# Patient Record
Sex: MALE | Race: WHITE | NOT HISPANIC OR LATINO | Employment: OTHER | URBAN - METROPOLITAN AREA
[De-identification: names, ages, dates, MRNs, and addresses within clinical notes are randomized per-mention and may not be internally consistent; named-entity substitution may affect disease eponyms.]

---

## 2018-07-25 ENCOUNTER — HOSPITAL ENCOUNTER (INPATIENT)
Facility: MEDICAL CENTER | Age: 73
LOS: 1 days | DRG: 176 | End: 2018-07-26
Attending: EMERGENCY MEDICINE | Admitting: INTERNAL MEDICINE
Payer: COMMERCIAL

## 2018-07-25 DIAGNOSIS — I26.99 OTHER ACUTE PULMONARY EMBOLISM WITHOUT ACUTE COR PULMONALE (HCC): ICD-10-CM

## 2018-07-25 PROBLEM — R10.9 ABDOMINAL PAIN: Status: ACTIVE | Noted: 2018-07-25

## 2018-07-25 PROBLEM — R60.0 EDEMA OF BOTH LEGS: Status: ACTIVE | Noted: 2018-07-25

## 2018-07-25 PROBLEM — D64.9 ANEMIA: Status: ACTIVE | Noted: 2018-07-25

## 2018-07-25 LAB
ALBUMIN SERPL BCP-MCNC: 3.7 G/DL (ref 3.2–4.9)
ALBUMIN/GLOB SERPL: 1.5 G/DL
ALP SERPL-CCNC: 72 U/L (ref 30–99)
ALT SERPL-CCNC: 31 U/L (ref 2–50)
ANION GAP SERPL CALC-SCNC: 7 MMOL/L (ref 0–11.9)
APTT PPP: 99.7 SEC (ref 24.7–36)
AST SERPL-CCNC: 17 U/L (ref 12–45)
BILIRUB SERPL-MCNC: 0.5 MG/DL (ref 0.1–1.5)
BNP SERPL-MCNC: 28 PG/ML (ref 0–100)
BUN SERPL-MCNC: 13 MG/DL (ref 8–22)
CALCIUM SERPL-MCNC: 8.9 MG/DL (ref 8.5–10.5)
CHLORIDE SERPL-SCNC: 101 MMOL/L (ref 96–112)
CO2 SERPL-SCNC: 28 MMOL/L (ref 20–33)
CREAT SERPL-MCNC: 0.91 MG/DL (ref 0.5–1.4)
EKG IMPRESSION: NORMAL
ERYTHROCYTE [DISTWIDTH] IN BLOOD BY AUTOMATED COUNT: 44.3 FL (ref 35.9–50)
FOLATE SERPL-MCNC: 14.3 NG/ML
GLOBULIN SER CALC-MCNC: 2.4 G/DL (ref 1.9–3.5)
GLUCOSE SERPL-MCNC: 94 MG/DL (ref 65–99)
HCT VFR BLD AUTO: 35.3 % (ref 42–52)
HGB BLD-MCNC: 11.5 G/DL (ref 14–18)
MAGNESIUM SERPL-MCNC: 1.8 MG/DL (ref 1.5–2.5)
MCH RBC QN AUTO: 29.4 PG (ref 27–33)
MCHC RBC AUTO-ENTMCNC: 32.6 G/DL (ref 33.7–35.3)
MCV RBC AUTO: 90.3 FL (ref 81.4–97.8)
PHOSPHATE SERPL-MCNC: 4 MG/DL (ref 2.5–4.5)
PLATELET # BLD AUTO: 309 K/UL (ref 164–446)
PMV BLD AUTO: 9.2 FL (ref 9–12.9)
POTASSIUM SERPL-SCNC: 3.9 MMOL/L (ref 3.6–5.5)
PROT SERPL-MCNC: 6.1 G/DL (ref 6–8.2)
RBC # BLD AUTO: 3.91 M/UL (ref 4.7–6.1)
SODIUM SERPL-SCNC: 136 MMOL/L (ref 135–145)
TROPONIN I SERPL-MCNC: <0.01 NG/ML (ref 0–0.04)
WBC # BLD AUTO: 5.6 K/UL (ref 4.8–10.8)

## 2018-07-25 PROCEDURE — 99223 1ST HOSP IP/OBS HIGH 75: CPT | Mod: 25 | Performed by: INTERNAL MEDICINE

## 2018-07-25 PROCEDURE — 96365 THER/PROPH/DIAG IV INF INIT: CPT

## 2018-07-25 PROCEDURE — 84100 ASSAY OF PHOSPHORUS: CPT

## 2018-07-25 PROCEDURE — 99285 EMERGENCY DEPT VISIT HI MDM: CPT

## 2018-07-25 PROCEDURE — 83735 ASSAY OF MAGNESIUM: CPT

## 2018-07-25 PROCEDURE — 700111 HCHG RX REV CODE 636 W/ 250 OVERRIDE (IP): Performed by: STUDENT IN AN ORGANIZED HEALTH CARE EDUCATION/TRAINING PROGRAM

## 2018-07-25 PROCEDURE — 93306 TTE W/DOPPLER COMPLETE: CPT

## 2018-07-25 PROCEDURE — 93970 EXTREMITY STUDY: CPT | Mod: 26 | Performed by: SURGERY

## 2018-07-25 PROCEDURE — 93306 TTE W/DOPPLER COMPLETE: CPT | Mod: 26 | Performed by: INTERNAL MEDICINE

## 2018-07-25 PROCEDURE — 36415 COLL VENOUS BLD VENIPUNCTURE: CPT

## 2018-07-25 PROCEDURE — 93005 ELECTROCARDIOGRAM TRACING: CPT | Performed by: INTERNAL MEDICINE

## 2018-07-25 PROCEDURE — 80053 COMPREHEN METABOLIC PANEL: CPT

## 2018-07-25 PROCEDURE — 93970 EXTREMITY STUDY: CPT

## 2018-07-25 PROCEDURE — 83880 ASSAY OF NATRIURETIC PEPTIDE: CPT

## 2018-07-25 PROCEDURE — 96375 TX/PRO/DX INJ NEW DRUG ADDON: CPT

## 2018-07-25 PROCEDURE — 85027 COMPLETE CBC AUTOMATED: CPT

## 2018-07-25 PROCEDURE — 85730 THROMBOPLASTIN TIME PARTIAL: CPT

## 2018-07-25 PROCEDURE — 84484 ASSAY OF TROPONIN QUANT: CPT

## 2018-07-25 PROCEDURE — 770020 HCHG ROOM/CARE - TELE (206)

## 2018-07-25 PROCEDURE — 82746 ASSAY OF FOLIC ACID SERUM: CPT

## 2018-07-25 PROCEDURE — 93010 ELECTROCARDIOGRAM REPORT: CPT | Performed by: INTERNAL MEDICINE

## 2018-07-25 RX ORDER — BISACODYL 10 MG
10 SUPPOSITORY, RECTAL RECTAL
Status: DISCONTINUED | OUTPATIENT
Start: 2018-07-25 | End: 2018-07-26 | Stop reason: HOSPADM

## 2018-07-25 RX ORDER — ACETAMINOPHEN 325 MG/1
650 TABLET ORAL EVERY 6 HOURS PRN
Status: DISCONTINUED | OUTPATIENT
Start: 2018-07-25 | End: 2018-07-26 | Stop reason: HOSPADM

## 2018-07-25 RX ORDER — LABETALOL HYDROCHLORIDE 5 MG/ML
10 INJECTION, SOLUTION INTRAVENOUS EVERY 4 HOURS PRN
Status: DISCONTINUED | OUTPATIENT
Start: 2018-07-25 | End: 2018-07-26 | Stop reason: HOSPADM

## 2018-07-25 RX ORDER — HEPARIN SODIUM 1000 [USP'U]/ML
3800 INJECTION, SOLUTION INTRAVENOUS; SUBCUTANEOUS PRN
Status: DISCONTINUED | OUTPATIENT
Start: 2018-07-25 | End: 2018-07-25

## 2018-07-25 RX ORDER — HEPARIN SODIUM 1000 [USP'U]/ML
7000 INJECTION, SOLUTION INTRAVENOUS; SUBCUTANEOUS ONCE
Status: COMPLETED | OUTPATIENT
Start: 2018-07-25 | End: 2018-07-25

## 2018-07-25 RX ORDER — AMOXICILLIN 250 MG
2 CAPSULE ORAL 2 TIMES DAILY
Status: DISCONTINUED | OUTPATIENT
Start: 2018-07-25 | End: 2018-07-26 | Stop reason: HOSPADM

## 2018-07-25 RX ORDER — POLYETHYLENE GLYCOL 3350 17 G/17G
1 POWDER, FOR SOLUTION ORAL
Status: DISCONTINUED | OUTPATIENT
Start: 2018-07-25 | End: 2018-07-26 | Stop reason: HOSPADM

## 2018-07-25 RX ADMIN — HEPARIN SODIUM 7000 UNITS: 1000 INJECTION, SOLUTION INTRAVENOUS; SUBCUTANEOUS at 04:23

## 2018-07-25 RX ADMIN — HEPARIN SODIUM 25000 UNITS: 5000 INJECTION, SOLUTION INTRAVENOUS at 04:23

## 2018-07-25 RX ADMIN — ENOXAPARIN SODIUM 120 MG: 150 INJECTION SUBCUTANEOUS at 16:52

## 2018-07-25 ASSESSMENT — ENCOUNTER SYMPTOMS
PND: 0
LOSS OF CONSCIOUSNESS: 0
FLANK PAIN: 0
SORE THROAT: 0
WEIGHT LOSS: 0
WHEEZING: 0
FEVER: 0
DOUBLE VISION: 0
ABDOMINAL PAIN: 0
DEPRESSION: 0
MYALGIAS: 0
WEAKNESS: 0
CONSTIPATION: 0
INSOMNIA: 0
PHOTOPHOBIA: 0
DIARRHEA: 0
BLOOD IN STOOL: 0
ORTHOPNEA: 0
BACK PAIN: 0
HEMOPTYSIS: 0
VOMITING: 0
HEADACHES: 0
CHILLS: 0
PALPITATIONS: 0
CLAUDICATION: 0
TINGLING: 0
EYE PAIN: 0
SHORTNESS OF BREATH: 0
SENSORY CHANGE: 0
BLURRED VISION: 0
DIAPHORESIS: 0
SEIZURES: 0
NAUSEA: 0
EYE DISCHARGE: 0
BRUISES/BLEEDS EASILY: 0
ABDOMINAL PAIN: 1
HEARTBURN: 0
SPUTUM PRODUCTION: 0
DIZZINESS: 0
NECK PAIN: 0
COUGH: 0

## 2018-07-25 ASSESSMENT — LIFESTYLE VARIABLES
TOTAL SCORE: 0
TOTAL SCORE: 0
EVER_SMOKED: YES
HAVE YOU EVER FELT YOU SHOULD CUT DOWN ON YOUR DRINKING: NO
TOTAL SCORE: 0
SUBSTANCE_ABUSE: 0
HAVE PEOPLE ANNOYED YOU BY CRITICIZING YOUR DRINKING: NO
ON A TYPICAL DAY WHEN YOU DRINK ALCOHOL HOW MANY DRINKS DO YOU HAVE: 1
AVERAGE NUMBER OF DAYS PER WEEK YOU HAVE A DRINK CONTAINING ALCOHOL: 4
CONSUMPTION TOTAL: NEGATIVE
EVER FELT BAD OR GUILTY ABOUT YOUR DRINKING: NO
EVER HAD A DRINK FIRST THING IN THE MORNING TO STEADY YOUR NERVES TO GET RID OF A HANGOVER: NO
ALCOHOL_USE: YES
HOW MANY TIMES IN THE PAST YEAR HAVE YOU HAD 5 OR MORE DRINKS IN A DAY: 0
EVER_SMOKED: YES

## 2018-07-25 ASSESSMENT — COGNITIVE AND FUNCTIONAL STATUS - GENERAL
SUGGESTED CMS G CODE MODIFIER MOBILITY: CH
MOBILITY SCORE: 24
SUGGESTED CMS G CODE MODIFIER DAILY ACTIVITY: CH
DAILY ACTIVITIY SCORE: 24

## 2018-07-25 ASSESSMENT — PATIENT HEALTH QUESTIONNAIRE - PHQ9
2. FEELING DOWN, DEPRESSED, IRRITABLE, OR HOPELESS: NOT AT ALL
SUM OF ALL RESPONSES TO PHQ9 QUESTIONS 1 AND 2: 0
1. LITTLE INTEREST OR PLEASURE IN DOING THINGS: NOT AT ALL

## 2018-07-25 ASSESSMENT — PAIN SCALES - GENERAL
PAINLEVEL_OUTOF10: 0

## 2018-07-25 ASSESSMENT — COPD QUESTIONNAIRES
DURING THE PAST 4 WEEKS HOW MUCH DID YOU FEEL SHORT OF BREATH: NONE/LITTLE OF THE TIME
HAVE YOU SMOKED AT LEAST 100 CIGARETTES IN YOUR ENTIRE LIFE: YES
COPD SCREENING SCORE: 4
DO YOU EVER COUGH UP ANY MUCUS OR PHLEGM?: NO/ONLY WITH OCCASIONAL COLDS OR INFECTIONS

## 2018-07-25 NOTE — ASSESSMENT & PLAN NOTE
Pt presented to ED for sharp/stabbing upper abdomial pain that's worsened by deep breaths. Radiates to R.Lung  [facility]  [facility] showed L. Lower lobe atelectasis vs pneumonia scar  [facility] showed Segmental embolus in the right lower lobe along with me  U/S of both lower legs, proximal and distal to R/O DVT  Started Pt on heparin  Rectal exam was negative for blood  Ordered FOBT

## 2018-07-25 NOTE — H&P
Internal Medicine Admitting History and Physical    Note Author: Jeremi James M.D.       Name Pramod Guillen 1945   Age/Sex 73 y.o. male   MRN [id number]   Code Status Full code     After 5PM or if no immediate response to page, please call for cross-coverage  Attending/Team: Angel See Patient List for primary contact information  Call [phone] to page    1st Call - Day Intern (R1):   Genaro 2nd Call - Day Sr. Resident (R2/R3):   Yaneli       Chief Complaint:   Upper abdominal pain    HPI:  Pt is 74 YO male came through ED for sharp/stabbing upper abdomial pain that's worsened by deep breaths. He reports the pain started on 2018 then went away and came back much worse on 2018. It has not gone away since then. 8/10 on pain scale. [facility] showed Segmental embolus in the R. lower lobe.Pt also reports his legs have swollen rently. Denies chest pain, SOB, cough or Palpitations. Rectal exam was negative for blood.    Review of Systems   Constitutional: Negative for chills, diaphoresis, fever, malaise/fatigue and weight loss.   HENT: Negative for ear discharge, ear pain, hearing loss, nosebleeds and tinnitus.    Eyes: Negative for blurred vision, double vision, photophobia, pain and discharge.   Respiratory: Negative for cough, hemoptysis, sputum production, shortness of breath and wheezing.    Cardiovascular: Positive for leg swelling. Negative for chest pain, palpitations, orthopnea, claudication and PND.   Gastrointestinal: Positive for abdominal pain. Negative for blood in stool, constipation, diarrhea, heartburn, melena, nausea and vomiting.   Genitourinary: Negative for dysuria, flank pain, frequency, hematuria and urgency.   Musculoskeletal: Negative for back pain, joint pain, myalgias and neck pain.   Skin: Negative for itching and rash.   Neurological: Negative for dizziness, tingling, seizures, loss of consciousness,  "weakness and headaches.   Endo/Heme/Allergies: Negative for environmental allergies. Does not bruise/bleed easily.   Psychiatric/Behavioral: Negative for depression, substance abuse and suicidal ideas.             Past Medical History (Chronic medical problem, known complications and current treatment)     Hernia when the Pt was 8 years old    Past Surgical History:  History reviewed. No pertinent surgical history.    Current Outpatient Medications:  Home Medications    **Home medications have not yet been reviewed for this encounter**         Medication Allergy/Sensitivities:  Allergies not on file      Family History (mandatory)   History reviewed. No pertinent family history.    Social History (mandatory)   Social History     Social History   • Marital status:      Spouse name: N/A   • Number of children: N/A   • Years of education: N/A     Occupational History   • Not on file.     Social History Main Topics   • Smoking status: Never Smoker   • Smokeless tobacco: Never Used   • Alcohol use Yes      Comment: occasional beer    • Drug use: No   • Sexual activity: Not on file     Other Topics Concern   • Not on file     Social History Narrative   • No narrative on file     Living situation: Alone  PCP : No primary care provider on file.    Physical Exam     Vitals:    07/25/18 0009 07/25/18 0011 07/25/18 0427   BP:  131/70    Pulse:  70 65   Resp:  16 20   SpO2:  96% 98%   Weight: 116.6 kg (257 lb)     Height: 1.88 m (6' 2\")       Body mass index is 33 kg/m².  /70   Pulse 65   Resp 20   Ht 1.88 m (6' 2\")   Wt 116.6 kg (257 lb)   SpO2 98%   BMI 33.00 kg/m²   O2 therapy: Pulse Oximetry: 98 %, O2 (LPM): 2    Physical Exam   Constitutional: He is oriented to person, place, and time and well-developed, well-nourished, and in no distress. No distress.   HENT:   Head: Normocephalic and atraumatic.   Mouth/Throat: Oropharynx is clear and moist. No oropharyngeal exudate.   Eyes: Pupils are equal, round, " and reactive to light. Conjunctivae and EOM are normal. Right eye exhibits no discharge. Left eye exhibits no discharge. No scleral icterus.   Neck: Normal range of motion. Neck supple. No JVD present. No tracheal deviation present. No thyromegaly present.   Cardiovascular: Normal rate, regular rhythm, normal heart sounds and intact distal pulses.  Exam reveals no gallop and no friction rub.    No murmur heard.  Pulmonary/Chest: Effort normal and breath sounds normal. No stridor. No respiratory distress. He has no wheezes. He has no rales. He exhibits no tenderness.   Abdominal: Soft. Bowel sounds are normal. He exhibits no mass. There is tenderness (RUQ, Epigastric and LUQ). There is no rebound and no guarding.   Genitourinary: Rectum normal.   Genitourinary Comments: FOBT negative   Musculoskeletal: Normal range of motion. He exhibits edema (Pitting edema in both legs but L>R). He exhibits no tenderness or deformity.   Warm L. leg   Neurological: He is alert and oriented to person, place, and time. He displays normal reflexes. A cranial nerve deficit is present. He exhibits normal muscle tone. Coordination normal. GCS score is 15.   Skin: Skin is warm and dry. No rash noted. He is not diaphoretic. No erythema. No pallor.   Psychiatric: Mood, memory, affect and judgment normal.         Data Review       Old Records Request:   Completed  Current Records review/summary: Completed    Lab Data Review:  No results found for this or any previous visit (from the past 24 hour(s)).    Imaging/Procedures Review:    Independant Imaging Review: Completed  LE VENOUS DUPLEX    (Results Pending)            EKG:   EKG Independant Review: Completed  QTc:, HR: , Normal Sinus Rhythm, no ST/T changes     Records reviewed and summarized in current documentation :  Yes  [employer] teaching service handout given to patient:  Yes         Assessment/Plan     * Abdominal pain   Assessment & Plan    Pt presented to ED for sharp/stabbing upper  abdomial pain that's worsened by deep breaths. Radiates to R.Lung    - Ordered CBC, CMP   - [facility]  - [facility] showed L. Lower lobe atelectasis vs pneumonia scar  - CT [facility] showed Segmental embolus in the right lower lobe along with me  - U/S of both lower legs, proximal and distal to R/O DVT  - Started Pt on heparin  - Rectal exam was negative for blood  - Ordered FOBT        Edema of both legs   Assessment & Plan    Pt presented to ED for sharp/stabbing upper abdomial pain that's worsened by deep breaths    -U/S of both lower legs, proximal and distal to R/O DVT  - During Physical exam pt did have pitting edema in both legs(L>R) and L. Leg was worm  - CT [facility] showed Segmental embolus in the right lower lobe along with me  - Started Pt on heparin  - Rectal exam was negative for blood  - Ordered FOBT        Pulmonary embolism (HCC)   Assessment & Plan    Pt presented to ED for sharp/stabbing upper abdomial pain that's worsened by deep breaths  - [facility] showed Segmental embolus in the right lower lobe along with me  - Started Pt on heparin  - Rectal exam was negative for blood  - Ordered FOBT  -U/S of both lower legs, proximal and distal to R/O DVT  - During Physical exam pt did have pitting edema in both legs(L>R) and L. Leg was worm  - KUB from the [facility] showed L. Lower lobe atelectasis vs pneumonia scar            Anticipated Hospital stay:  >2 midnights        Quality Measures  Quality-Core Measures   Reviewed items::  EKG reviewed, Labs reviewed, Medications reviewed and Radiology images reviewed  Smith catheter::  No Smith  DVT prophylaxis pharmacological::  Heparin    PCP: No primary care provider on file.

## 2018-07-25 NOTE — SENIOR ADMIT NOTE
"73 y.o. male [facility] where [facility] presented with a 3 day history of right sided pleuritic chest pain and was noted for right subsegmental pulmonary embolus. He associates it with bilateral lower extremity edema and admits to increased sedentary lifestyle within the last year as his wife passed away back then.  He denies dyspnea at rest or exertion, hemoptysis, treatment for active malignancy, palpitations, surgery within the last 4 weeks or history of PE/DVT.      Exam:  /69   Pulse 73   Temp 36.5 °C (97.7 °F)   Resp 19   Ht 1.88 m (6' 2\")   Wt 112.4 kg (247 lb 12.8 oz)   SpO2 95%   BMI 31.82 kg/m²     Physical exam:   General: comfortable,not in acute distress  HEENT: NC/AT. PERRLA, EOMI. moist mucous membranes. No lymphadenopathy.  CVS: RRR, S1S2 WNL, no MRG  RS: CTA, good air entry. No wheezes or ronchi.  Abdomen: Soft, NT/ND, BS +. No organomegaly  Ext: No pedal edema  Rectal: Guaiac neg  Neuro: CN 2-12 wnl. Motor and sensory exam wnl.    Assessment and Plan:  #Right subsegmental pulmonary embolus  -3 day h/o of pleuritic chest pain  -Admits to h/o of sedentary lifestyle  -Lt LE edema  -No dyspnea, hemoptysis, recent surgery or malignancy  -Noted for PE on [facility] CT  -Guaiac neg  -Started on Heparin drip  -Pending LE DVT U/S   -Labs on AM      For further details , please refer to H&P by Dr. James    "

## 2018-07-25 NOTE — DISCHARGE PLANNING
Anticipated Discharge Disposition: Home    Action: PT lives in [place] and was independent before admissions.  Pt has PCP Anjel at the [facility] in [place].  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  Pt wife passed 2 yrs ago and states he hasn't been cooking healthy meals since then.  Pt on 2 L O2 now but does not use O2 at home.  Also pt was not taking anticoagulants at home.    Barriers to Discharge: medical clearance    Plan: F/u with home O2 and prior auth for medications if needed

## 2018-07-25 NOTE — PROGRESS NOTES
Internal Medicine Interval Note  Note Author: Rosalino Lam D.O.     Name Pramod Guillen 1945   Age/Sex 73 y.o. male   MRN [id number]   Code Status FULL     After 5PM or if no immediate response to page, please call for cross-coverage  Attending/Team: Angel See Patient List for primary contact information  Call [phone] to page    1st Call - Day Intern (R1):   Dr. Lam 2nd Call - Day Sr. Resident (R2/R3):   Dr. Groves         Reason for interval visit  (Principal Problem)   Right Lower Extremity Weakness      Interval Problem Daily Status Update  (24 hours, problem oriented, brief subjective history, new lab/imaging data pertinent to that problem)     Patient reports that he has some pain in his right flank when he breathes deep[facility] in the right lower lobe.    Rectal exam shows negative guiac. FOBT pending.    Patient started on heparin initially, this was changed to Lovenox 1mg/kg.    Review of Systems   Constitutional: Negative for chills and fever.   HENT: Negative for congestion, hearing loss, sore throat and tinnitus.    Eyes: Negative for blurred vision and double vision.   Respiratory: Negative for cough, hemoptysis and shortness of breath.    Cardiovascular: Positive for leg swelling. Negative for chest pain and palpitations.   Gastrointestinal: Negative for abdominal pain, constipation, diarrhea, heartburn and nausea.   Genitourinary: Negative for dysuria, frequency, hematuria and urgency.   Musculoskeletal: Negative for myalgias and neck pain.   Skin: Negative for itching and rash.   Neurological: Negative for dizziness, sensory change and headaches.   Endo/Heme/Allergies: Does not bruise/bleed easily.   Psychiatric/Behavioral: Negative for depression. The patient does not have insomnia.        Disposition/Barriers to discharge:   Patient is doing well clinically, likely to be discharged tomorrow after starting NOAC with follow up with  [facility].    Consultants/Specialty  PCP: [facility]      Quality Measures  Quality-Core Measures   Reviewed items::  EKG reviewed, Labs reviewed, Medications reviewed and Radiology images reviewed  Smith catheter::  No Smith  DVT prophylaxis pharmacological::  Heparin          Physical Exam       Vitals:    07/25/18 0427 07/25/18 0455 07/25/18 0800 07/25/18 1200   BP:  129/69 112/60 121/62   Pulse: 65 73 63 64   Resp: 20 19 18 18   Temp:  36.5 °C (97.7 °F) 36.5 °C (97.7 °F) 36.3 °C (97.4 °F)   SpO2: 98% 95% 97% 98%   Weight:  112.4 kg (247 lb 12.8 oz)     Height:         Body mass index is 31.82 kg/m². Weight: 112.4 kg (247 lb 12.8 oz)  Oxygen Therapy:  Pulse Oximetry: 98 %, O2 (LPM): 2, O2 Delivery: Silicone Nasal Cannula    Physical Exam   Constitutional: He is oriented to person, place, and time. No distress.   HENT:   Head: Normocephalic and atraumatic.   Eyes: Pupils are equal, round, and reactive to light. Right eye exhibits no discharge. Left eye exhibits no discharge. No scleral icterus.   Neck: Neck supple. No JVD present. No tracheal deviation present. No thyromegaly present.   Cardiovascular: Regular rhythm and intact distal pulses.  Exam reveals no gallop and no friction rub.    No murmur heard.  Increased rate   Pulmonary/Chest: No respiratory distress. He has no wheezes. He has rales. He exhibits no tenderness.   Crackles bilaterally at the lung bases posteriorly   Abdominal: Soft. Bowel sounds are normal. He exhibits no distension. There is no tenderness. There is no rebound.   Genitourinary: Rectal exam shows guaiac negative stool.   Genitourinary Comments: Rectal performed   Musculoskeletal: He exhibits edema.   Bilateral lower extremity edema   Neurological: He is alert and oriented to person, place, and time. He displays normal reflexes. No cranial nerve deficit. He exhibits normal muscle tone. Coordination normal.   Skin: Skin is warm. No rash noted. He is not  diaphoretic. No erythema.   Psychiatric: Mood, affect and judgment normal.             Assessment/Plan     * Acute pulmonary embolism (HCC)   Assessment & Plan    Pt presented to ED for sharp/stabbing pain with inspiratoin that was worsened with deep breaths.  [facility] in the right lower lobe. Troponin Negative. BNP 28. No evidence of right heart strain, though EKG shows RBBB.  Pt started on heparin in ED. This was later switched to therapeutic Lovenox.  Rectal exam - negative guiac  FOBT ordered.  U/S of both lower legs, proximal and distal negative.  [facility]UB from the [facility] showed L. Lower lobe atelectasis vs pneumonia scar.    Plan:   Continue therapeutic Lovenox for now. Patient will be started on NOAC.  Patient will need follow up with [facility]CP at [facility].  Echocardiogram        Anemia   Assessment & Plan    Patient presented with a hemoglobin of 11.5, MCV of 95.3.  Likely normocytic. Perhaps secondary to blood loss given patient's presentation with PE. Question hypercoagulable state.  Plan:  -Iron, Folate, TIBC, B12, Folate  -Monitor H&H        Edema of both legs   Assessment & Plan    Pt presented to ED for right flank pain that's worsened by deep breaths.  During physical exam patient had pitting edema in both legs.  Albumin 3.7  U/S of both lower legs 07/25, proximal and distal were negative for thrombus.

## 2018-07-25 NOTE — ED PROVIDER NOTES
"ED Provider Note    CHIEF COMPLAINT  Chief Complaint   Patient presents with   • Pulmonary Embolism       HPI  Pramod Guillen is a 73 y.o. male who presents with right-sided chest pain. The patient states this started on Sunday states it is so severe it took him to his knees. He did have associated shortness of breath as well as a pleuritic component. His chest pain continued on the right side and he went to the [facility] today where is found to have a subsegmental pulmonary embolus and he is transferred here for high level of care. The patient has noticed increased edema to his bilateral lower extremities. He does not of any history of heart failure. He has not had any recent immobilization. The patient states he does not have any cardiac history and he currently does not take any medication. The patient states his chest pain was pleuritic otherwise he is unaware of any exacerbating or relieving factors. He did have the exertional dyspnea described above. He did not have any associated nausea or diaphoresis associated with the discomfort. He states the pain was located in the right side of his chest with no radicular component.    REVIEW OF SYSTEMS  See HPI for further details. All other systems are negative.     PAST MEDICAL HISTORY  History reviewed. No pertinent past medical history.    SOCIAL HISTORY  Social History     Social History   • Marital status: N/A     Spouse name: N/A   • Number of children: N/A   • Years of education: N/A     Social History Main Topics   • Smoking status: Never Smoker   • Smokeless tobacco: Never Used   • Alcohol use Yes      Comment: occasional beer    • Drug use: No   • Sexual activity: Not on file     Other Topics Concern   • Not on file     Social History Narrative   • No narrative on file           PHYSICAL EXAM  VITAL SIGNS: /70   Pulse 70   Resp 16   Ht 1.88 m (6' 2\")   Wt 116.6 kg (257 lb)   SpO2 96%   BMI 33.00 kg/m²   Constitutional: Mild acute " distress, Non-toxic appearance.   HENT: Normocephalic, Atraumatic, tympanic membranes are intact and nonerythematous bilaterally, Oropharynx moist without exudates or erythema, Nose normal.   Eyes: PERRLA, EOMI, Conjunctiva normal.  Neck: Supple without meningismus  Lymphatic: No lymphadenopathy noted.   Cardiovascular: Normal heart rate, Normal rhythm, No murmurs, No rubs, No gallops.   Thorax & Lungs: Normal breath sounds, No respiratory distress, No wheezing, No chest tenderness.   Abdomen: Bowel sounds normal, Soft, No tenderness, no rebound, no guarding, no distention, No masses, No pulsatile masses.   Skin: Warm, Dry, No erythema, No rash.   Back: No tenderness, No CVA tenderness.   Genitalia: External genitalia appear normal, No masses or lesions. No discharge.   Rectal: Normal appearance, Normal sphincter tone. No external or internal lesions noted. Stool is normal color and heme negative.   Extremities: Atraumatic with symmetric distal pulses, bilateral lower extremity edema, No tenderness, No cyanosis, No clubbing.   Neurologic: Alert & oriented x 3, cranial nerves II through XII are intact, Normal motor function, Normal sensory function, No focal deficits noted.   Psychiatric: Affect normal, Judgment normal, Mood normal.     COURSE & MEDICAL DECISION MAKING  Pertinent Labs & Imaging studies reviewed. (See chart for details)  This a 73-year-old gentleman who presents to the emergency department with a pulmonary embolus on the right. The patient does not appear toxic nor does he appear to be in acute distress. The patient has a history of anticoagulation and will get the patient to the internal medicine residents for further workup and treatment.    FINAL IMPRESSION  1. Pulmonary embolus       Disposition  The patient will be admitted in stable condition    Electronically signed by: Arvin Garcia, 7/25/2018 12:27 AM

## 2018-07-25 NOTE — CARE PLAN
Problem: Safety  Goal: Will remain free from injury    Intervention: Provide assistance with mobility  Patient up with stand by assist. Steady on feet. Educated patient on importance of calling nurses before getting out of bed. Treaded slipper socks on. Bed in low/locked position.       Problem: Venous Thromboembolism (VTW)/Deep Vein Thrombosis (DVT) Prevention:  Goal: Patient will participate in Venous Thrombosis (VTE)/Deep Vein Thrombosis (DVT)Prevention Measures    Intervention: Assess and monitor for anticoagulation complications  Patient on heparin gtt. Monitoring aPTTs

## 2018-07-25 NOTE — ASSESSMENT & PLAN NOTE
Rivaroxaban for outpatient anticoagulation.   15mg BID for 21 days, then stop.  Followed by 20mg daily for 3 months.  [facility].

## 2018-07-25 NOTE — ED TRIAGE NOTES
[facility] or [facility].  Pt given 60 toradol IV and 40 mg lasix IV at [facility].  Pt has BLE edema.  Pain controlled at this time.

## 2018-07-25 NOTE — ASSESSMENT & PLAN NOTE
Pt presented to ED with right flank pain that's worsened by deep breaths.  During physical exam patient had pitting edema in both legs.  No evidence of DVT based on duplex ultrasound.

## 2018-07-26 ENCOUNTER — PATIENT OUTREACH (OUTPATIENT)
Dept: HEALTH INFORMATION MANAGEMENT | Facility: OTHER | Age: 73
End: 2018-07-26

## 2018-07-26 VITALS
HEIGHT: 74 IN | TEMPERATURE: 97 F | HEART RATE: 59 BPM | OXYGEN SATURATION: 95 % | BODY MASS INDEX: 31.75 KG/M2 | WEIGHT: 247.36 LBS | SYSTOLIC BLOOD PRESSURE: 114 MMHG | RESPIRATION RATE: 18 BRPM | DIASTOLIC BLOOD PRESSURE: 60 MMHG

## 2018-07-26 LAB
ALBUMIN SERPL BCP-MCNC: 3.6 G/DL (ref 3.2–4.9)
ALBUMIN/GLOB SERPL: 1.4 G/DL
ALP SERPL-CCNC: 75 U/L (ref 30–99)
ALT SERPL-CCNC: 32 U/L (ref 2–50)
ANION GAP SERPL CALC-SCNC: 8 MMOL/L (ref 0–11.9)
AST SERPL-CCNC: 17 U/L (ref 12–45)
BASOPHILS # BLD AUTO: 0.7 % (ref 0–1.8)
BASOPHILS # BLD: 0.04 K/UL (ref 0–0.12)
BILIRUB SERPL-MCNC: 0.5 MG/DL (ref 0.1–1.5)
BUN SERPL-MCNC: 14 MG/DL (ref 8–22)
CALCIUM SERPL-MCNC: 8.9 MG/DL (ref 8.5–10.5)
CHLORIDE SERPL-SCNC: 103 MMOL/L (ref 96–112)
CO2 SERPL-SCNC: 25 MMOL/L (ref 20–33)
CREAT SERPL-MCNC: 0.89 MG/DL (ref 0.5–1.4)
DEPRECATED D DIMER PPP IA-ACNC: 694 NG/ML(D-DU)
EOSINOPHIL # BLD AUTO: 0.14 K/UL (ref 0–0.51)
EOSINOPHIL NFR BLD: 2.4 % (ref 0–6.9)
ERYTHROCYTE [DISTWIDTH] IN BLOOD BY AUTOMATED COUNT: 43.2 FL (ref 35.9–50)
FERRITIN SERPL-MCNC: 80.8 NG/ML (ref 22–322)
GLOBULIN SER CALC-MCNC: 2.6 G/DL (ref 1.9–3.5)
GLUCOSE SERPL-MCNC: 97 MG/DL (ref 65–99)
HCT VFR BLD AUTO: 35.9 % (ref 42–52)
HEMOCCULT STL QL: NEGATIVE
HGB BLD-MCNC: 12.1 G/DL (ref 14–18)
IMM GRANULOCYTES # BLD AUTO: 0.03 K/UL (ref 0–0.11)
IMM GRANULOCYTES NFR BLD AUTO: 0.5 % (ref 0–0.9)
IRON SATN MFR SERPL: 12 % (ref 15–55)
IRON SERPL-MCNC: 36 UG/DL (ref 50–180)
LV EJECT FRACT  99904: 60
LV EJECT FRACT MOD 2C 99903: 60.2
LV EJECT FRACT MOD 4C 99902: 58.27
LV EJECT FRACT MOD BP 99901: 60.07
LYMPHOCYTES # BLD AUTO: 1.24 K/UL (ref 1–4.8)
LYMPHOCYTES NFR BLD: 21.1 % (ref 22–41)
MCH RBC QN AUTO: 29.7 PG (ref 27–33)
MCHC RBC AUTO-ENTMCNC: 33.7 G/DL (ref 33.7–35.3)
MCV RBC AUTO: 88 FL (ref 81.4–97.8)
MONOCYTES # BLD AUTO: 0.69 K/UL (ref 0–0.85)
MONOCYTES NFR BLD AUTO: 11.7 % (ref 0–13.4)
NEUTROPHILS # BLD AUTO: 3.75 K/UL (ref 1.82–7.42)
NEUTROPHILS NFR BLD: 63.6 % (ref 44–72)
NRBC # BLD AUTO: 0 K/UL
NRBC BLD-RTO: 0 /100 WBC
PLATELET # BLD AUTO: 320 K/UL (ref 164–446)
PMV BLD AUTO: 9.2 FL (ref 9–12.9)
POTASSIUM SERPL-SCNC: 4.1 MMOL/L (ref 3.6–5.5)
PROT SERPL-MCNC: 6.2 G/DL (ref 6–8.2)
RBC # BLD AUTO: 4.08 M/UL (ref 4.7–6.1)
SODIUM SERPL-SCNC: 136 MMOL/L (ref 135–145)
TIBC SERPL-MCNC: 312 UG/DL (ref 250–450)
TRANSFERRIN SERPL-MCNC: 226 MG/DL (ref 200–370)
VIT B12 SERPL-MCNC: 326 PG/ML (ref 211–911)
WBC # BLD AUTO: 5.9 K/UL (ref 4.8–10.8)

## 2018-07-26 PROCEDURE — 84466 ASSAY OF TRANSFERRIN: CPT

## 2018-07-26 PROCEDURE — 83540 ASSAY OF IRON: CPT

## 2018-07-26 PROCEDURE — 700111 HCHG RX REV CODE 636 W/ 250 OVERRIDE (IP): Performed by: STUDENT IN AN ORGANIZED HEALTH CARE EDUCATION/TRAINING PROGRAM

## 2018-07-26 PROCEDURE — 82272 OCCULT BLD FECES 1-3 TESTS: CPT

## 2018-07-26 PROCEDURE — 82607 VITAMIN B-12: CPT

## 2018-07-26 PROCEDURE — 99239 HOSP IP/OBS DSCHRG MGMT >30: CPT | Mod: GC | Performed by: INTERNAL MEDICINE

## 2018-07-26 PROCEDURE — A9270 NON-COVERED ITEM OR SERVICE: HCPCS | Performed by: STUDENT IN AN ORGANIZED HEALTH CARE EDUCATION/TRAINING PROGRAM

## 2018-07-26 PROCEDURE — 700102 HCHG RX REV CODE 250 W/ 637 OVERRIDE(OP): Performed by: STUDENT IN AN ORGANIZED HEALTH CARE EDUCATION/TRAINING PROGRAM

## 2018-07-26 PROCEDURE — 82728 ASSAY OF FERRITIN: CPT

## 2018-07-26 PROCEDURE — 36415 COLL VENOUS BLD VENIPUNCTURE: CPT

## 2018-07-26 PROCEDURE — 83550 IRON BINDING TEST: CPT

## 2018-07-26 PROCEDURE — 80053 COMPREHEN METABOLIC PANEL: CPT

## 2018-07-26 PROCEDURE — 85379 FIBRIN DEGRADATION QUANT: CPT

## 2018-07-26 PROCEDURE — 85025 COMPLETE CBC W/AUTO DIFF WBC: CPT

## 2018-07-26 RX ADMIN — ENOXAPARIN SODIUM 120 MG: 150 INJECTION SUBCUTANEOUS at 04:39

## 2018-07-26 RX ADMIN — STANDARDIZED SENNA CONCENTRATE AND DOCUSATE SODIUM 2 TABLET: 8.6; 5 TABLET, FILM COATED ORAL at 04:39

## 2018-07-26 ASSESSMENT — PAIN SCALES - GENERAL
PAINLEVEL_OUTOF10: 0

## 2018-07-26 ASSESSMENT — ENCOUNTER SYMPTOMS
PALPITATIONS: 0
INSOMNIA: 0
MYALGIAS: 0
NECK PAIN: 0
SORE THROAT: 0
HEMOPTYSIS: 0
SENSORY CHANGE: 0
DIZZINESS: 0
DIARRHEA: 0
CONSTIPATION: 0
NAUSEA: 0
BRUISES/BLEEDS EASILY: 0
HEARTBURN: 0
FALLS: 0
DEPRESSION: 0
SHORTNESS OF BREATH: 0
DOUBLE VISION: 0
FEVER: 0
CHILLS: 0
ABDOMINAL PAIN: 0
COUGH: 0
HEADACHES: 0
BLURRED VISION: 0

## 2018-07-26 NOTE — CARE PLAN
Problem: Safety  Goal: Will remain free from injury    Intervention: Provide assistance with mobility  Patient up ambulating independently, steady on feet. Treaded slipper socks on. Bed in low/locked position.       Problem: Venous Thromboembolism (VTW)/Deep Vein Thrombosis (DVT) Prevention:  Goal: Patient will participate in Venous Thrombosis (VTE)/Deep Vein Thrombosis (DVT)Prevention Measures  Patient on SQ lovenox at this time.

## 2018-07-26 NOTE — DISCHARGE INSTRUCTIONS
Discharge Instructions    Discharged to home by car with relative. Discharged via wheelchair, hospital escort: Yes.  Special equipment needed: Not Applicable    Be sure to schedule a follow-up appointment with your primary care doctor or any specialists as instructed.     Discharge Plan:   Diet Plan: Discussed  Activity Level: Discussed  Confirmed Follow up Appointment: Appointment Scheduled (pt has apt with PCP in a few days already scheduled)  Confirmed Symptoms Management: Discussed  Medication Reconciliation Updated: Yes  Pneumococcal Vaccine Administered/Refused: Not given - Patient refused pneumococcal vaccine  Influenza Vaccine Indication: Patient Refuses    I understand that a diet low in cholesterol, fat, and sodium is recommended for good health. Unless I have been given specific instructions below for another diet, I accept this instruction as my diet prescription.     Special Instructions:   Pulmonary Embolism  A pulmonary embolism (PE) is a sudden blockage or decrease of blood flow in one lung or both lungs. Most blockages come from a blood clot that travels from the legs or the pelvis to the lungs. PE is a dangerous and potentially life-threatening condition if it is not treated right away.  What are the causes?  A pulmonary embolism occurs most commonly when a blood clot travels from one of your veins to your lungs. Rarely, PE is caused by air, fat, amniotic fluid, or part of a tumor traveling through your veins to your lungs.  What increases the risk?  A PE is more likely to develop in:  · People who smoke.  · People who are older, especially over 60 years of age.  · People who are overweight (obese).  · People who sit or lie still for a long time, such as during long-distance travel (over 4 hours), bed rest, hospitalization, or during recovery from certain medical conditions like a stroke.  · People who do not engage in much physical activity (sedentary lifestyle).  · People who have chronic  breathing disorders.  · People who have a personal or family history of blood clots or blood clotting disease.  · People who have peripheral vascular disease (PVD), diabetes, or some types of cancer.  · People who have heart disease, especially if the person had a recent heart attack or has congestive heart failure.  · People who have neurological diseases that affect the legs (leg paresis).  · People who have had a traumatic injury, such as breaking a hip or leg.  · People who have recently had major or lengthy surgery, especially on the hip, knee, or abdomen.  · People who have had a central line placed inside a large vein.  · People who take medicines that contain the hormone estrogen. These include birth control pills and hormone replacement therapy.  · Pregnancy or during childbirth or the postpartum period.  What are the signs or symptoms?  The symptoms of a PE usually start suddenly and include:  · Shortness of breath while active or at rest.  · Coughing or coughing up blood or blood-tinged mucus.  · Chest pain that is often worse with deep breaths.  · Rapid or irregular heartbeat.  · Feeling light-headed or dizzy.  · Fainting.  · Feeling anxious.  · Sweating.  There may also be pain and swelling in a leg if that is where the blood clot started.  These symptoms may represent a serious problem that is an emergency. Do not wait to see if the symptoms will go away. Get medical help right away. Call your local emergency services (911 in the [place] Do not drive yourself to the hospital.   How is this diagnosed?  Your health care provider will take a medical history and perform a physical exam. You may also have other tests, including:  · Blood tests to assess the clotting properties of your blood, assess oxygen levels in your blood, and find blood clots.  · Imaging tests, such as CT, ultrasound, MRI, X-ray, and other tests to see if you have clots anywhere in your body.  · An electrocardiogram (ECG) to look for  heart strain from blood clots in the lungs.  How is this treated?  The main goals of PE treatment are:  · To stop a blood clot from growing larger.  · To stop new blood clots from forming.  The type of treatment that you receive depends on many factors, such as the cause of your PE, your risk for bleeding or developing more clots, and other medical conditions that you have. Sometimes, a combination of treatments is necessary.  This condition may be treated with:  · Medicines, including newer oral blood thinners (anticoagulants), warfarin, low molecular weight heparins, thrombolytics, or heparins.  · Wearing compression stockings or using different types of devices.  · Surgery (rare) to remove the blood clot or to place a filter in your abdomen to stop the blood clot from traveling to your lungs.  Treatments for a PE are often divided into immediate treatment, long-term treatment (up to 3 months after PE), and extended treatment (more than 3 months after PE). Your treatment may continue for several months. This is called maintenance therapy, and it is used to prevent the forming of new blood clots. You can work with your health care provider to choose the treatment program that is best for you.  What are anticoagulants?   Anticoagulants are medicines that treat PEs. They can stop current blood clots from growing and stop new clots from forming. They cannot dissolve existing clots. Your body dissolves clots by itself over time. Anticoagulants are given by mouth, by injection, or through an IV tube.  What are thrombolytics?   Thrombolytics are clot-dissolving medicines that are used to dissolve a PE. They carry a high risk of bleeding, so they tend to be used only in severe cases or if you have very low blood pressure.  Follow these instructions at home:  If you are taking a newer oral anticoagulant:  · Take the medicine every single day at the same time each day.  · Understand what foods and drugs interact with this  medicine.  · Understand that there are no regular blood tests required when using this medicine.  · Understand the side effects of this medicine, including excessive bruising or bleeding. Ask your health care provider or pharmacist about other possible side effects.  If you are taking warfarin:  · Understand how to take warfarin and know which foods can affect how warfarin works in your body.  · Understand that it is dangerous to take too much or too little warfarin. Too much warfarin increases the risk of bleeding. Too little warfarin continues to allow the risk for blood clots.  · Follow your PT and INR blood testing schedule. The PT and INR results allow your health care provider to adjust your dose of warfarin. It is very important that you have your PT and INR tested as often as told by your health care provider.  · Avoid major changes in your diet, or tell your health care provider before you change your diet. Arrange a visit with a registered dietitian to answer your questions. Many foods, especially foods that are high in vitamin K, can interfere with warfarin and affect the PT and INR results. Eat a consistent amount of foods that are high in vitamin K, such as:  ¨ Spinach, kale, broccoli, cabbage, ravindra greens, turnip greens, [place] sprouts, peas, cauliflower, seaweed, and parsley.  ¨ Beef liver and pork liver.  ¨ Green tea.  ¨ Soybean oil.  · Tell your health care provider about any and all medicines, vitamins, and supplements that you take, including aspirin and other over-the-counter anti-inflammatory medicines. Be especially cautious with aspirin and anti-inflammatory medicines. Do not take those before you ask your health care provider if it is safe to do so. This is important because many medicines can interfere with warfarin and affect the PT and INR results.  · Do not start or stop taking any over-the-counter or prescription medicine unless your health care provider or pharmacist tells you to  do so.  If you take warfarin, you will also need to do these things:  · Hold pressure over cuts for longer than usual.  · Tell your dentist and other health care providers that you are taking warfarin before you have any procedures in which bleeding may occur.  · Avoid alcohol or drink very small amounts. Tell your health care provider if you change your alcohol intake.  · Do not use tobacco products, including cigarettes, chewing tobacco, and e-cigarettes. If you need help quitting, ask your health care provider.  · Avoid contact sports.  General instructions  · Take over-the-counter and prescription medicines only as told by your health care provider. Anticoagulant medicines can have side effects, including easy bruising and difficulty stopping bleeding. If you are prescribed an anticoagulant, you will also need to do these things:  ¨ Hold pressure over cuts for longer than usual.  ¨ Tell your dentist and other health care providers that you are taking anticoagulants before you have any procedures in which bleeding may occur.  ¨ Avoid contact sports.  · Wear a medical alert bracelet or carry a medical alert card that says you have had a PE.  · Ask your health care provider how soon you can go back to your normal activities. Stay active to prevent new blood clots from forming.  · Make sure to exercise while traveling or when you have been sitting or standing for a long period of time. It is very important to exercise. Exercise your legs by walking or by tightening and relaxing your leg muscles often. Take frequent walks.  · Wear compression stockings as told by your health care provider to help prevent more blood clots from forming.  · Do not use tobacco products, including cigarettes, chewing tobacco, and e-cigarettes. If you need help quitting, ask your health care provider.  · Keep all follow-up appointments with your health care provider. This is important.  How is this prevented?  Take these actions to  decrease your risk of developing another PE:  · Exercise regularly. For at least 30 minutes every day, engage in:  ¨ Activity that involves moving your arms and legs.  ¨ Activity that encourages good blood flow through your body by increasing your heart rate.  · Exercise your arms and legs every hour during long-distance travel (over 4 hours). Drink plenty of water and avoid drinking alcohol while traveling.  · Avoid sitting or lying in bed for long periods of time without moving your legs.  · Maintain a weight that is appropriate for your height. Ask your health care provider what weight is healthy for you.  · If you are a woman who is over 35 years of age, avoid unnecessary use of medicines that contain estrogen. These include birth control pills.  · Do not smoke, especially if you take estrogen medicines. If you need help quitting, ask your health care provider.  · If you are at very high risk for PE, wear compression stockings.  · If you recently had a PE, have regularly scheduled ultrasound testing on your legs to check for new blood clots.  If you are hospitalized, prevention measures may include:  · Early walking after surgery, as soon as your health care provider says that it is safe.  · Receiving anticoagulants to prevent blood clots. If you cannot take anticoagulants, other options may be available, such as wearing compression stockings or using different types of devices.  Get help right away if:  · You have new or increased pain, swelling, or redness in an arm or leg.  · You have numbness or tingling in an arm or leg.  · You have shortness of breath while active or at rest.  · You have chest pain.  · You have a rapid or irregular heartbeat.  · You feel light-headed or dizzy.  · You cough up blood.  · You notice blood in your vomit, bowel movement, or urine.  · You have a fever.  These symptoms may represent a serious problem that is an emergency. Do not wait to see if the symptoms will go away. Get  medical help right away. Call your local emergency services (911 in the [place] Do not drive yourself to the hospital.   This information is not intended to replace advice given to you by your health care provider. Make sure you discuss any questions you have with your health care provider.  Document Released: 12/15/2001 Document Revised: 05/25/2017 Document Reviewed: 04/13/2016  [employer] Patient Education © 2017 [employer]      Rivaroxaban oral tablets  What is this medicine?  RIVAROXABAN (ri va KEYLA a ban) is an anticoagulant (blood thinner). It is used to treat blood clots in the lungs or in the veins. It is also used after knee or hip surgeries to prevent blood clots. It is also used to lower the chance of stroke in people with a medical condition called atrial fibrillation.  This medicine may be used for other purposes; ask your health care provider or pharmacist if you have questions.  COMMON BRAND NAME(S): Xarelto, Xarelto Starter Pack  What should I tell my health care provider before I take this medicine?  They need to know if you have any of these conditions:  -bleeding disorders  -bleeding in the brain  -blood in your stools (black or tarry stools) or if you have blood in your vomit  -history of stomach bleeding  -kidney disease  -liver disease  -low blood counts, like low white cell, platelet, or red cell counts  -recent or planned spinal or epidural procedure  -take medicines that treat or prevent blood clots  -an unusual or allergic reaction to rivaroxaban, other medicines, foods, dyes, or preservatives  -pregnant or trying to get pregnant  -breast-feeding  How should I use this medicine?  Take this medicine by mouth with a glass of water. Follow the directions on the prescription label. Take your medicine at regular intervals. Do not take it more often than directed. Do not stop taking except on your doctor's advice. Stopping this medicine may increase your risk of a blood clot. Be sure  to refill your prescription before you run out of medicine.  If you are taking this medicine after hip or knee replacement surgery, take it with or without food. If you are taking this medicine for atrial fibrillation, take it with your evening meal. If you are taking this medicine to treat blood clots, take it with food at the same time each day. If you are unable to swallow your tablet, you may crush the tablet and mix it in applesauce. Then, immediately eat the applesauce. You should eat more food right after you eat the applesauce containing the crushed tablet.  Talk to your pediatrician regarding the use of this medicine in children. Special care may be needed.  Overdosage: If you think you have taken too much of this medicine contact a poison control center or emergency room at once.  NOTE: This medicine is only for you. Do not share this medicine with others.  What if I miss a dose?  If you take your medicine once a day and miss a dose, take the missed dose as soon as you remember. If you take your medicine twice a day and miss a dose, take the missed dose immediately. In this instance, 2 tablets may be taken at the same time. The next day you should take 1 tablet twice a day as directed.  What may interact with this medicine?  Do not take this medicine with any of the following medications:  -defibrotide  This medicine may also interact with the following medications:  -aspirin and aspirin-like medicines  -certain antibiotics like erythromycin, azithromycin, and clarithromycin  -certain medicines for fungal infections like ketoconazole and itraconazole  -certain medicines for irregular heart beat like amiodarone, quinidine, dronedarone  -certain medicines for seizures like carbamazepine, phenytoin  -certain medicines that treat or prevent blood clots like warfarin, enoxaparin, and dalteparin  -conivaptan  -diltiazem  -felodipine  -indinavir  -lopinavir; ritonavir  -NSAIDS, medicines for pain and  inflammation, like ibuprofen or naproxen  -ranolazine  -rifampin  -ritonavir  -SNRIs, medicines for depression, like desvenlafaxine, duloxetine, levomilnacipran, venlafaxine  -SSRIs, medicines for depression, like citalopram, escitalopram, fluoxetine, fluvoxamine, paroxetine, sertraline  -Lia's wort  -verapamil  This list may not describe all possible interactions. Give your health care provider a list of all the medicines, herbs, non-prescription drugs, or dietary supplements you use. Also tell them if you smoke, drink alcohol, or use illegal drugs. Some items may interact with your medicine.  What should I watch for while using this medicine?  Visit your doctor or health care professional for regular checks on your progress.  Notify your doctor or health care professional and seek emergency treatment if you develop breathing problems; changes in vision; chest pain; severe, sudden headache; pain, swelling, warmth in the leg; trouble speaking; sudden numbness or weakness of the face, arm or leg. These can be signs that your condition has gotten worse.  If you are going to have surgery or other procedure, tell your doctor that you are taking this medicine.  What side effects may I notice from receiving this medicine?  Side effects that you should report to your doctor or health care professional as soon as possible:  -allergic reactions like skin rash, itching or hives, swelling of the face, lips, or tongue  -back pain  -redness, blistering, peeling or loosening of the skin, including inside the mouth  -signs and symptoms of bleeding such as bloody or black, tarry stools; red or dark-brown urine; spitting up blood or brown material that looks like coffee grounds; red spots on the skin; unusual bruising or bleeding from the eye, gums, or nose  Side effects that usually do not require medical attention (report to your doctor or health care professional if they continue or are bothersome):  -dizziness  -muscle  pain  This list may not describe all possible side effects. Call your doctor for medical advice about side effects. You may report side effects to FDA at 1-757-AQB-5002.  Where should I keep my medicine?  Keep out of the reach of children.  Store at room temperature between 15 and 30 degrees C (59 and 86 degrees F). Throw away any unused medicine after the expiration date.  NOTE: This sheet is a summary. It may not cover all possible information. If you have questions about this medicine, talk to your doctor, pharmacist, or health care provider.  © 2018 [employer] Standard (2017-09-06 16:29:33)            · Is patient discharged on Warfarin / Coumadin?   No     Depression / Suicide Risk    As you are discharged from this [facility] facility, it is important to learn how to keep safe from harming yourself.    Recognize the warning signs:  · Abrupt changes in personality, positive or negative- including increase in energy   · Giving away possessions  · Change in eating patterns- significant weight changes-  positive or negative  · Change in sleeping patterns- unable to sleep or sleeping all the time   · Unwillingness or inability to communicate  · Depression  · Unusual sadness, discouragement and loneliness  · Talk of wanting to die  · Neglect of personal appearance   · Rebelliousness- reckless behavior  · Withdrawal from people/activities they love  · Confusion- inability to concentrate     If you or a loved one observes any of these behaviors or has concerns about self-harm, here's what you can do:  · Talk about it- your feelings and reasons for harming yourself  · Remove any means that you might use to hurt yourself (examples: pills, rope, extension cords, firearm)  · Get professional help from the community (Mental Health, Substance Abuse, psychological counseling)  · Do not be alone:Call your Safe Contact- someone whom you trust who will be there for you.  · Call your local CRISIS HOTLINE 688-8397 or  743-419-6363  · Call your local Children's Mobile Crisis Response Team [place] (223) 175-9044 or www.[employer]Giga  · Call the toll free National Suicide Prevention Hotlines   · National Suicide Prevention Lifeline 277-180-ZCSJ (7047)  · National [employer] Line Network 800-SUICIDE (045-2499)

## 2018-07-26 NOTE — PROGRESS NOTES
Care of patient assumed after report. Patient resting in bed with eyes closed, no distress noted. Call light within patient's reach. Will continue to monitor.

## 2018-07-26 NOTE — PROGRESS NOTES
Internal Medicine Interval Note  Note Author: Rosalino Lam D.O.     Name Pramod Guillen 1945   Age/Sex 73 y.o. male   MRN [id number]   Code Status FULL     After 5PM or if no immediate response to page, please call for cross-coverage  Attending/Team: Angel See Patient List for primary contact information  Call [phone] to page    1st Call - Day Intern (R1):   Dr. Lam 2nd Call - Day Sr. Resident (R2/R3):   Dr. Groves         Reason for interval visit  (Principal Problem)   Right Lower Extremity Weakness      Interval Problem Daily Status Update  (24 hours, problem oriented, brief subjective history, new lab/imaging data pertinent to that problem)     Patient has been improving overnight. States that he still has some pain in his right side when he breathes deep.    Denies any chest pain. States that he can breathe effectively.     Patient has been able to ambulate without shortness of breath.     Review of Systems   Constitutional: Negative for chills and fever.   HENT: Negative for congestion, hearing loss, sore throat and tinnitus.    Eyes: Negative for blurred vision and double vision.   Respiratory: Negative for cough, hemoptysis and shortness of breath.    Cardiovascular: Positive for leg swelling. Negative for chest pain and palpitations.        Bilateral lower extremities   Gastrointestinal: Negative for abdominal pain, constipation, diarrhea, heartburn and nausea.   Genitourinary: Negative for dysuria, frequency, hematuria and urgency.   Musculoskeletal: Negative for falls, myalgias and neck pain.   Skin: Negative for itching and rash.   Neurological: Negative for dizziness, sensory change and headaches.   Endo/Heme/Allergies: Does not bruise/bleed easily.   Psychiatric/Behavioral: Negative for depression. The patient does not have insomnia.        Disposition/Barriers to discharge:   Patient is doing well clinically, to be discharged today on Xarelto for  anticoagulation.    Consultants/Specialty  PCP: Patient sees primary care physician at [facility].      Quality Measures  Quality-Core Measures   Reviewed items::  Labs reviewed, Medications reviewed and Radiology images reviewed  Smith catheter::  No Smith  DVT prophylaxis pharmacological::  Heparin          Physical Exam       Vitals:    07/25/18 1908 07/25/18 2345 07/26/18 0400 07/26/18 0700   BP: 120/65 116/63 101/50 114/60   Pulse: 77 66 61 (!) 59   Resp: 17 18 18 18   Temp: 36.7 °C (98 °F) 36.7 °C (98.1 °F) 36.4 °C (97.6 °F) 36.1 °C (97 °F)   SpO2: 98% 94% 95% 95%   Weight: 112.2 kg (247 lb 5.7 oz)      Height:         Body mass index is 31.76 kg/m². Weight: 112.2 kg (247 lb 5.7 oz)  Oxygen Therapy:  Pulse Oximetry: 95 %, O2 (LPM): 0, O2 Delivery: Silicone Nasal Cannula    Physical Exam   Constitutional: He is oriented to person, place, and time. No distress.   HENT:   Head: Normocephalic and atraumatic.   Right Ear: External ear normal.   Left Ear: External ear normal.   Mouth/Throat: Oropharynx is clear and moist.   Eyes: Pupils are equal, round, and reactive to light. Right eye exhibits no discharge. Left eye exhibits no discharge. No scleral icterus.   Neck: Neck supple. No JVD present. No tracheal deviation present. No thyromegaly present.   Cardiovascular: Normal rate, regular rhythm and intact distal pulses.  Exam reveals no gallop and no friction rub.    No murmur heard.  Pulmonary/Chest: No respiratory distress. He has no wheezes. He has no rales. He exhibits no tenderness.   Crackles bilaterally at the lung bases   Abdominal: Soft. Bowel sounds are normal. He exhibits no distension. There is no tenderness. There is no rebound.   Genitourinary: Rectal exam shows guaiac negative stool.   Musculoskeletal: He exhibits edema.   1+ edema in lower extremities bilat.   Neurological: He is alert and oriented to person, place, and time. He displays normal reflexes. No cranial nerve deficit. He exhibits normal  muscle tone. Coordination normal.   Skin: Skin is warm. No rash noted. He is not diaphoretic. No erythema.   Psychiatric: Mood, affect and judgment normal.             Assessment/Plan     * Acute pulmonary embolism (HCC)   Assessment & Plan    Rivaroxaban for outpatient anticoagulation.   15mg BID for 21 days, then stop.  Followed by 20mg daily for 3 months.  [facility]        Anemia   Assessment & Plan    Hemoglobin stable at time of discharge with no evidence of bleeding.  Discussed with patient importance of follow up with gastroenterologist for screening colonoscopy.            Edema of both legs   Assessment & Plan    Pt presented to ED with right flank pain that's worsened by deep breaths.  During physical exam patient had pitting edema in both legs.  No evidence of DVT based on duplex ultrasound.

## 2018-07-26 NOTE — ASSESSMENT & PLAN NOTE
Hemoglobin stable at time of discharge with no evidence of bleeding.  Discussed with patient importance of follow up with gastroenterologist for screening colonoscopy.

## 2018-07-26 NOTE — NON-PROVIDER
Internal Medicine Medical Student Note  Note Author: Rocio Molina, Student    Name Pramod Guillen       1945   Age/Sex 73 y.o. male   MRN [id number]   Code Status FULL     Reason for interval visit  (Principal Problem)   Acute pulmonary embolism (HCC)    Interval Problem Daily Status Update  (problem status, last 24 hours, new history, new data )   Patient reports continuing pain in his right mid-axillary line ~8-10th rib.  The pain is stabbing, and intensified with deep inspiration.  He ranks it 4/10 at rest and 5/10 with inspiration.  Currently he has not taken any pain medication for management.  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  The second half of the day he is immobile- remaining in his lounge chair.  He also reported a road trip to [place] last week. His legs have remained swollen overnight, which he has noticed has worsened in the past week or so.  Vital signs are stable and wnl's, currently saturating 97% on room air.  Echocardiogram was ordered this morning, pending.  LE Venous ultrasound studies were completed.      Review of Systems   Constitutional: Negative for chills and fever.   Respiratory: Negative for cough and shortness of breath.    Cardiovascular: Positive for leg swelling. Negative for palpitations.   Gastrointestinal: Negative for abdominal pain, nausea and vomiting.         Physical Exam       Vitals:    18 0455 18 0800 18 1200 18 1600   BP: 129/69 112/60 121/62 116/67   Pulse: 73 63 64 67   Resp: 19  18   Temp: 36.5 °C (97.7 °F) 36.5 °C (97.7 °F) 36.3 °C (97.4 °F) 36.4 °C (97.6 °F)   SpO2: 95% 97% 98% 97%   Weight: 112.4 kg (247 lb 12.8 oz)      Height:         Body mass index is 31.82 kg/m². Weight: 112.4 kg (247 lb 12.8 oz)  Oxygen Therapy:  Pulse Oximetry: 97 %, O2 (LPM): 0, O2 Delivery: Silicone Nasal Cannula;None (Room Air)    Physical Exam   Constitutional: He is oriented to person, place, and time and  "well-developed, well-nourished, and in no distress.   HENT:   Head: Normocephalic and atraumatic.   Cardiovascular: Normal rate and regular rhythm.  Exam reveals no gallop and no friction rub.    No murmur heard.  +2 pitting edema bilaterally lower legs   Pulmonary/Chest: Effort normal. He has rales.   Abdominal: Soft. Bowel sounds are normal. He exhibits no distension. There is no tenderness. There is no guarding.   Neurological: He is alert and oriented to person, place, and time.   Skin: Skin is warm and dry.   Psychiatric: Mood and affect normal.     Pertinent Labs/Imaging/Reports  Hb 11.5/Hct 35.3/MCV 90.3  BNP 28  Troponin <0.01  APTT 99.7 (on heparin)    LE Venous Duplex US (7/25/2018): Findings  Bilateral lower extremities -  \"Complete color filling and compressibility with normal venous flow dynamics    including spontaneous flow, response to augmentation maneuvers, and    respiratory phasicity.\"     Assessment/Plan     1.  Acute pulmonary embolism: acute/stable  No major risk factors for PE- WELL's score is 0.  Need to assess cause of hypercoagulability.  Guaiac was negative on ED admission.    -continue enoxaparin inj 120 mg Q12 HRS SC  -patient will need 3 months of anticoagulation- follow up with PCP in [place]  -follow up with primary care for colonoscopy (no hx of colonoscopy)  -6 minute walk test, assess for dyspnea/chest pain  -D-dimer for baseline     2. Edema- bilateral lower legs: chronic/stable  Given his lower extremity swelling, rales in the lungs, cardiovascular function should be assessed.  -pending echocardiogram    3. Anemia: unknown status  -iron studies indicated  -occult stool test    Rocio Molina          "

## 2018-07-26 NOTE — PROGRESS NOTES
Discharge instructions given to patient at bedside, verbalizes understanding and states plans for follow-up with PCP. Medications reviewed, post-discharge activity level and worsening of symptoms needing follow-up care discussed. Telemetry monitor/IV cathlon removed. All belongings accounted for, all questions answered at this time.   Patient requested a pain medication and a prescription from MD for nutritional supplements. Dr. Lam notified of patient's requests.  Patient waiting for ride home, states they should be here around 1430

## 2018-07-28 NOTE — DISCHARGE SUMMARY
Internal Medicine Discharge Summary  Note Author: Norman Groves M.D.       Name Pramod Guillen 1945   Age/Sex 73 y.o. male   MRN [id number]         Admit Date:  2018       Discharge Date:   2018    Service:   [facility] Internal Medicine Red Team  Attending Physician(s):   Dr. Ortiz       Senior Resident(s):   Dr. Groves  Darshan Resident(s):   Dr. Lam  PCP: No primary care provider on file.      Primary Diagnosis:   Acute pulmonary embolism    Secondary Diagnoses:                Principal Problem:    Acute pulmonary embolism (HCC) POA: Unknown      Overview: Pt presented to ED for sharp/stabbing pain with inspiratoin that was       worsened with deep breaths.       [facility] L[facility] Lower lobe atelectasis vs pneumonia scar.      Rectal exam - negative guiac       [facility]  showed segmental embolus in the right lower lobe.        Troponin Negative. BNP 28.        Pt started on heparin in ED. D/C'd  Started therapeutic Lovenox       Duplex U/S of both lower legs, proximal and distal negative.       ECHO - EF of 60% with ascending aorta dilation of 4.0 cm-Recommend       follow-up.  Active Problems:    Edema of both legs POA: Unknown      Overview: U/S of both lower legs , proximal and distal were negative for       thrombus.          Anemia POA: Unknown      Overview: Patient presented with a hemoglobin of 11.5, MCV of 95.3.      Likely normocytic. Perhaps secondary to blood loss given patient's       presentation with PE.        Guaiac negative       Occult Blood Negative  Resolved Problems:    * No resolved hospital problems. *      Hospital Summary (Brief Narrative):       Mr. Guillen is a very pleasant 73-year-old male who [facility] after he was found to have a right subsegmental pulmonary embolus which caused a 3 day history of right-sided pleuritic chest pain.    He was started on a  heparin drip, and admitted for further evaluation.  From history, no provoking factors were found.  Rectal exam was done which did not reveal any occult blood or masses.  His saturations have remained stable, above 90% on room air.    He was found to be a good candidate for Xarelto, and he was therefore switched to it and discharged [facility].    Patient /Hospital Summary (Details -- Problem Oriented) :          Anemia   Assessment & Plan    Hemoglobin stable at time of discharge with no evidence of bleeding.  Discussed with patient importance of follow up with gastroenterologist for screening colonoscopy.        Edema of both legs   Assessment & Plan    Pt presented to ED with right flank pain that's worsened by deep breaths.  During physical exam patient had pitting edema in both legs.  No evidence of DVT based on duplex ultrasound.        * Acute pulmonary embolism (HCC)   Assessment & Plan    Rivaroxaban for outpatient anticoagulation.   15mg BID for 21 days, then stop.  Followed by 20mg daily for 3 months.  [facility].            Consultants:     None    Procedures:        None    Imaging/ Testing:      ECHOCARDIOGRAM COMP W/O CONT   Final Result      LE VENOUS DUPLEX   Final Result            Discharge Medications:         Medication Reconciliation: Completed       Medication List      START taking these medications      Instructions   * rivaroxaban 15 MG Tabs tablet  Commonly known as:  XARELTO   Take 1 Tab by mouth 2 Times a Day for 21 days.  Dose:  15 mg     * rivaroxaban 20 MG Tabs tablet  Start taking on:  8/16/2018  Commonly known as:  XARELTO   Take 1 Tab by mouth with dinner for 30 days.  Dose:  20 mg        * This list has 2 medication(s) that are the same as other medications prescribed for you. Read the directions carefully, and ask your doctor or other care provider to review them with you.                      Disposition:   Discharged home    Diet:    Healthy diet    Activity:   As tolerated    Instructions:         The patient was instructed to return to the ER in the event of worsening symptoms. I have counseled the patient on the importance of compliance and the patient has agreed to proceed with all medical recommendations and follow up plan indicated above.   The patient understands that all medications come with benefits and risks. Risks may include permanent injury or death and these risks can be minimized with close reassessment and monitoring.        Primary Care Provider:    No primary care provider on file.    Discharge summary faxed to primary care provider:  Deferred  Copy of discharge summary given to the patient: Deferred      Follow up appointment details :      Advised follow-up with primary care physician, and [facility].    Pending Studies:        None pending    Time spent on discharge day patient visit, preparing discharge paperwork and arranging for patient follow up.    Summary of follow up issues:   Will need outpatient follow-up regarding his pulmonary embolism.  Depending on risk, might consider working up for coagulation disorders.    Discharge Time (Minutes) :    42 minutes  Hospital Course Type: Inpatient Stay < 2 midnights, patient recovered more rapidly than anticipated      Condition on Discharge    ______________________________________________________________________    Interval history/exam for day of discharge:    Patient stable and ambulatory on discharge.  He satting well on room air.      Most Recent Labs:    Lab Results   Component Value Date/Time    WBC 5.9 07/26/2018 03:39 AM    RBC 4.08 (L) 07/26/2018 03:39 AM    HEMOGLOBIN 12.1 (L) 07/26/2018 03:39 AM    HEMATOCRIT 35.9 (L) 07/26/2018 03:39 AM    MCV 88.0 07/26/2018 03:39 AM    MCH 29.7 07/26/2018 03:39 AM    MCHC 33.7 07/26/2018 03:39 AM    MPV 9.2 07/26/2018 03:39 AM    NEUTSPOLYS 63.60 07/26/2018 03:39 AM    LYMPHOCYTES 21.10 (L)  07/26/2018 03:39 AM    MONOCYTES 11.70 07/26/2018 03:39 AM    EOSINOPHILS 2.40 07/26/2018 03:39 AM    BASOPHILS 0.70 07/26/2018 03:39 AM      Lab Results   Component Value Date/Time    SODIUM 136 07/26/2018 03:39 AM    POTASSIUM 4.1 07/26/2018 03:39 AM    CHLORIDE 103 07/26/2018 03:39 AM    CO2 25 07/26/2018 03:39 AM    GLUCOSE 97 07/26/2018 03:39 AM    BUN 14 07/26/2018 03:39 AM    CREATININE 0.89 07/26/2018 03:39 AM      Lab Results   Component Value Date/Time    ALTSGPT 32 07/26/2018 03:39 AM    ASTSGOT 17 07/26/2018 03:39 AM    ALKPHOSPHAT 75 07/26/2018 03:39 AM    TBILIRUBIN 0.5 07/26/2018 03:39 AM    ALBUMIN 3.6 07/26/2018 03:39 AM    GLOBULIN 2.6 07/26/2018 03:39 AM     No results found for: PROTHROMBTM, INR

## 2018-08-06 ENCOUNTER — HOSPITAL ENCOUNTER (OUTPATIENT)
Dept: RADIOLOGY | Facility: MEDICAL CENTER | Age: 73
End: 2018-08-06

## 2019-06-28 ENCOUNTER — APPOINTMENT (OUTPATIENT)
Dept: PHYSICAL THERAPY | Facility: REHABILITATION | Age: 74
End: 2019-06-28
Attending: FAMILY MEDICINE
Payer: COMMERCIAL

## 2021-07-06 ENCOUNTER — TELEPHONE (OUTPATIENT)
Dept: CARDIOLOGY | Facility: MEDICAL CENTER | Age: 76
End: 2021-07-06

## 2021-07-06 NOTE — TELEPHONE ENCOUNTER
[facility] such [facility] results, imaging cardiac related for NP appointment with  on 07/13/2021. Fax: [phone], Phone: [phone].    Fax confirmation has been received and sent to scan though [facility].

## 2021-07-13 ENCOUNTER — OFFICE VISIT (OUTPATIENT)
Dept: CARDIOLOGY | Facility: MEDICAL CENTER | Age: 76
End: 2021-07-13
Payer: COMMERCIAL

## 2021-07-13 VITALS
HEIGHT: 74 IN | SYSTOLIC BLOOD PRESSURE: 122 MMHG | DIASTOLIC BLOOD PRESSURE: 62 MMHG | RESPIRATION RATE: 16 BRPM | WEIGHT: 257 LBS | BODY MASS INDEX: 32.98 KG/M2 | HEART RATE: 83 BPM | OXYGEN SATURATION: 95 %

## 2021-07-13 DIAGNOSIS — I48.0 PAROXYSMAL ATRIAL FIBRILLATION (HCC): ICD-10-CM

## 2021-07-13 PROCEDURE — 93000 ELECTROCARDIOGRAM COMPLETE: CPT | Performed by: INTERNAL MEDICINE

## 2021-07-13 PROCEDURE — 99203 OFFICE O/P NEW LOW 30 MIN: CPT | Performed by: INTERNAL MEDICINE

## 2021-07-13 NOTE — PROGRESS NOTES
Arrhythmia Clinic Note (New patient)     DOS: 7/13/2021    Referring physician: [facility]    Chief complaint/Reason for consult: Palpitations    HPI: 76-year-old man with history of pulmonary embolism on chronic Xarelto, complains of episodic palpitations which occur once every couple of months.  It lasted for several hours at a time.  [place] a local emergency department or urgent care center.  Because of this he has never had his palpitations captured on ECG.  He has been having difficulty getting amb[facility].  He was told he needs to see a cardiologist first.  He does not have any syncope or presyncope, no chest pain.  He feels fatigued when he has episodes of palpitations and elevated heart rate..    ROS (+ highlighted in bold):  Constitutional: Fevers/chills/fatigue/weightloss  HEENT: Blurry vision/eye pain/sore throat/hearing loss  Respiratory: Shortness of breath/cough  Cardiovascular: Chest pain/palpitations/edema/orthopnea/syncope  GI: Nausea/vomitting/diarrhea  MSK: Arthralgias/myagias/muscle weakness  Skin: Rash/sores  Neurological: Numbness/tremors/vertigo  Endocrine: Excessive thirst/polyuria/cold intolerance/heat intolerance  Psych: Depression/anxiety    No past medical history on file.   Pulmonary embolism on chronic Xarelto    No past surgical history on file.    Social History     Socioeconomic History   • Marital status:      Spouse name: Not on file   • Number of children: Not on file   • Years of education: Not on file   • Highest education level: Not on file   Occupational History   • Not on file   Tobacco Use   • Smoking status: Never Smoker   • Smokeless tobacco: Never Used   Substance and Sexual Activity   • Alcohol use: Yes     Comment: occasional beer    • Drug use: No   • Sexual activity: Not on file   Other Topics Concern   • Not on file   Social History Narrative   • Not on file     Social  "Determinants of Health     Financial Resource Strain:    • Difficulty of Paying Living Expenses:    Food Insecurity:    • Worried About Running Out of Food in the Last Year:    • Ran Out of Food in the Last Year:    Transportation Needs:    • Lack of Transportation (Medical):    • Lack of Transportation (Non-Medical):    Physical Activity:    • Days of Exercise per Week:    • Minutes of Exercise per Session:    Stress:    • Feeling of Stress :    Social Connections:    • Frequency of Communication with Friends and Family:    • Frequency of Social Gatherings with Friends and Family:    • Attends Hindu Services:    • Active Member of Clubs or Organizations:    • Attends Club or Organization Meetings:    • Marital Status:    Intimate Partner Violence:    • Fear of Current or Ex-Partner:    • Emotionally Abused:    • Physically Abused:    • Sexually Abused:        No family history on file.   Denies family history of premature coronary disease or sudden cardiac death    No Known Allergies    Current Outpatient Medications   Medication Sig Dispense Refill   • rivaroxaban (XARELTO) 20 MG Tab tablet Take 20 mg by mouth with dinner.       No current facility-administered medications for this visit.       Physical Exam:  Vitals:    07/13/21 1354   BP: 122/62   BP Location: Left arm   Patient Position: Sitting   BP Cuff Size: Adult   Pulse: 83   Resp: 16   SpO2: 95%   Weight: 117 kg (257 lb)   Height: 1.88 m (6' 2\")     General appearance: NAD, conversant   Eyes: anicteric sclerae, moist conjunctivae; no lid-lag; PERRLA  HENT: Atraumatic; oropharynx clear with moist mucous membranes and no mucosal ulcerations; normal hard and soft palate  Neck: Trachea midline; FROM, supple, no thyromegaly or lymphadenopathy  Lungs: CTA, with normal respiratory effort and no intercostal retractions  CV: RRR, no MRGs, no JVD   Abdomen: Soft, non-tender; no masses or HSM  Extremities: No peripheral edema or extremity lymphadenopathy  Skin: " Normal temperature, turgor and texture; no rash, ulcers or subcutaneous nodules  Psych: Appropriate affect, alert and oriented to person, place and time    Data:  Lipids:   No results found for: CHOLSTRLTOT, TRIGLYCERIDE, HDL, LDL     BMP:  Lab Results   Component Value Date/Time    SODIUM 136 07/26/2018 0339    POTASSIUM 4.1 07/26/2018 0339    CHLORIDE 103 07/26/2018 0339    CO2 25 07/26/2018 0339    GLUCOSE 97 07/26/2018 0339    BUN 14 07/26/2018 0339    CREATININE 0.89 07/26/2018 0339    CALCIUM 8.9 07/26/2018 0339    ANION 8.0 07/26/2018 0339        TSH:   No results found for: TSHULTRASEN     THYROXINE (T4):   No results found for: FREEDIR     CBC:   Lab Results   Component Value Date/Time    WBC 5.9 07/26/2018 03:39 AM    RBC 4.08 (L) 07/26/2018 03:39 AM    HEMOGLOBIN 12.1 (L) 07/26/2018 03:39 AM    HEMATOCRIT 35.9 (L) 07/26/2018 03:39 AM    MCV 88.0 07/26/2018 03:39 AM    MCH 29.7 07/26/2018 03:39 AM    MCHC 33.7 07/26/2018 03:39 AM    RDW 43.2 07/26/2018 03:39 AM    PLATELETCT 320 07/26/2018 03:39 AM    MPV 9.2 07/26/2018 03:39 AM    NEUTSPOLYS 63.60 07/26/2018 03:39 AM    LYMPHOCYTES 21.10 (L) 07/26/2018 03:39 AM    MONOCYTES 11.70 07/26/2018 03:39 AM    EOSINOPHILS 2.40 07/26/2018 03:39 AM    BASOPHILS 0.70 07/26/2018 03:39 AM    IMMGRAN 0.50 07/26/2018 03:39 AM    NRBC 0.00 07/26/2018 03:39 AM    NEUTS 3.75 07/26/2018 03:39 AM    LYMPHS 1.24 07/26/2018 03:39 AM    MONOS 0.69 07/26/2018 03:39 AM    EOS 0.14 07/26/2018 03:39 AM    BASO 0.04 07/26/2018 03:39 AM    IMMGRANAB 0.03 07/26/2018 03:39 AM    NRBCAB 0.00 07/26/2018 03:39 AM        CBC w/o DIFF  Lab Results   Component Value Date/Time    WBC 5.9 07/26/2018 03:39 AM    RBC 4.08 (L) 07/26/2018 03:39 AM    HEMOGLOBIN 12.1 (L) 07/26/2018 03:39 AM    MCV 88.0 07/26/2018 03:39 AM    MCH 29.7 07/26/2018 03:39 AM    MCHC 33.7 07/26/2018 03:39 AM    RDW 43.2 07/26/2018 03:39 AM    MPV 9.2 07/26/2018 03:39 AM       Prior echo/stress results reviewed: [facility] reviewed, normal EF    EKG interpreted by me: Normal sinus rhythm    [facility] from [facility] interpreted and scanned into media tab: Normal sinus rhythm    Impression/Plan:  1.  Palpitations  2.  Pulmonary embolism  3.  Encounter for chronic anticoagulation management    -He is on Xarelto indefinitely for pulmonary embolism, managed [facility]  -We discussed work-up for his palpitations could include ambulatory monitoring.  I discussed loop recorder implantation for screening for atrial fibrillation or SVT, given the infrequent nature of his symptoms.  I also discussed Banno which she could use at home to capture any episodes.  At this time he wishes to have his cardiac event recorder arranged through the [facility].  I asked him to have his [facility] physician request these records to pass along these recommendations.  He prefers to have his care managed through the [facility], so I will defer further management to his cardiologist at the [facility].  If you would like to see me again through [facility], I am happy to have future follow-up.    Royer Branham MD  Cardiac Electrophysiology

## 2021-07-16 LAB — EKG IMPRESSION: NORMAL

## 2022-09-30 NOTE — PROGRESS NOTES
Care of patient assumed after report. Patient awake, alert and oriented. Denies having any pain at this time, no distress noted. Bed alarm on, call light in reach, fall precautions in place. Heparin gtt rate verified. Discussed plan of care with patient. Will continue to monitor.     Vital Signs